# Patient Record
(demographics unavailable — no encounter records)

---

## 2017-10-24 NOTE — OP
DATE OF PROCEDURE:  10/24/2017

 

TITLE OF PROCEDURE:  Colonoscopy.

 

PREPROCEDURE DIAGNOSES:

1.  Screening.

2.  Family history of colon cancer in maternal grandmother.

 

POSTPROCEDURE DIAGNOSES:

1.  Exam to cecum; good bowel preparation.

2.  Normal colon.

3.  Small internal hemorrhoids.

4.  No colonic polyps seen.

 

PROCEDURE IN DETAIL:  Written informed consent was obtained.  The patient was brought to the endosco
py suite.  Total intravenous anesthesia was provided by Blanca Johns CRNA.  The patient was placed
 in the left lateral decubitus position.  A digital rectal exam was performed that was unremarkable.
  A Pentax video colonoscope was inserted through the anal canal and advanced under direct visualiza
tion to the cecum.  Position in the cecum was verified by clear identification of the appendiceal or
ifice and the ileocecal valve.  The quality of the bowel preparation was good.  Each colon segment w
as examined carefully as the colonoscope was slowly withdrawn from the cecum.  Vascular pattern and 
haustral folds appeared normal.  No polyp, diverticulum or vascular ectasia was identified.  In the 
rectum, a retroflexed view demonstrated small internal hemorrhoids that were not actively bleeding. 
 The colon was decompressed as the colonoscope was removed from the patient.  There were no immediat
e complications.  She was transferred to the day stay surgery area for post-procedure monitoring.

 

RECOMMENDATIONS:

1.  Resume previous diet and medications.

2.  Repeat colonoscopy in 7 years.

3.  Follow up with Gastroenterology as needed.